# Patient Record
Sex: FEMALE | Race: WHITE
[De-identification: names, ages, dates, MRNs, and addresses within clinical notes are randomized per-mention and may not be internally consistent; named-entity substitution may affect disease eponyms.]

---

## 2018-07-03 ENCOUNTER — HOSPITAL ENCOUNTER (EMERGENCY)
Dept: HOSPITAL 25 - UCEAST | Age: 83
Discharge: HOME | End: 2018-07-03
Payer: MEDICARE

## 2018-07-03 VITALS — SYSTOLIC BLOOD PRESSURE: 144 MMHG | DIASTOLIC BLOOD PRESSURE: 74 MMHG

## 2018-07-03 DIAGNOSIS — S51.812A: Primary | ICD-10-CM

## 2018-07-03 DIAGNOSIS — L03.114: ICD-10-CM

## 2018-07-03 DIAGNOSIS — J44.9: ICD-10-CM

## 2018-07-03 PROCEDURE — 99213 OFFICE O/P EST LOW 20 MIN: CPT

## 2018-07-03 PROCEDURE — G0463 HOSPITAL OUTPT CLINIC VISIT: HCPCS

## 2018-07-03 NOTE — UC
Skin Complaint HPI





- HPI Summary


HPI Summary: 





84 yo female s/p skin tear left FA on 6/29


no cleaned well


now red





thinks Td is UTD





no fever





she cut flap off with scissors





- History of Current Complaint


Chief Complaint: UCSkin


Time Seen by Provider: 07/03/18 08:48


Stated Complaint: SKIN COMPLAINT


Hx Obtained From: Patient


Onset/Duration: Sudden Onset, Lasting Days


Timing: Constant


Onset Severity: Mild


Current Severity: None


Pain Intensity: 0


Pain Scale Used: 0-10 Numeric


Location: Discrete


Character: Redness


Aggravating Factor(s): Nothing


Alleviating Factor(s): Nothing


Associated Signs & Symptoms: Positive: Negative


Related History: Trauma





- Allergy/Home Medications


Allergies/Adverse Reactions: 


 Allergies











Allergy/AdvReac Type Severity Reaction Status Date / Time


 


No Known Allergies Allergy   Verified 07/03/18 08:22














Review of Systems


Constitutional: Negative


Skin: Negative


Eyes: Negative


ENT: Negative


Respiratory: Negative


Cardiovascular: Negative


Gastrointestinal: Negative


Genitourinary: Negative


Motor: Negative


Neurovascular: Negative


Musculoskeletal: Negative


Neurological: Negative


Psychological: Negative


Is Patient Immunocompromised?: No


All Other Systems Reviewed And Are Negative: Yes





PMH/Surg Hx/FS Hx/Imm Hx


Endocrine History: Dyslipidemia


Cardiovascular History: Deep Vein Thrombosis


Respiratory History: COPD


Other History Of: Anticoagulant Therapy - Coumadin





- Surgical History


Surgical History: Yes


Surgery Procedure, Year, and Place: csection x4.  tubal ligation.  

appendectomy.  tonsilectomy,left arm





- Social History


Alcohol Use: Occasionally


Substance Use Type: None


Smoking Status (MU): Never Smoked Tobacco





Physical Exam


Triage Information Reviewed: Yes


Appearance: Well-Appearing, No Pain Distress, Well-Nourished


Vital Signs: 


 Initial Vital Signs











Temp  97.8 F   07/03/18 08:18


 


Pulse  73   07/03/18 08:18


 


Resp  18   07/03/18 08:18


 


BP  144/74   07/03/18 08:18


 


Pulse Ox  99   07/03/18 08:18











Eyes: Positive: Conjunctiva Clear


ENT: Negative: Trismus, Muffled voice, Hoarse voice


Neck: Positive: Supple, Nontender


Respiratory: Positive: Lungs clear, Normal breath sounds, No respiratory 

distress, No accessory muscle use


Cardiovascular: Positive: RRR, No Murmur


Musculoskeletal: Positive: ROM Intact


Neurological: Positive: Alert


Psychological Exam: Normal


Skin Exam: Other - aboout 2 x 2.5 cm area of skin lose left FA with volar 

erthyema





Course/Dx





- Diagnoses


Provider Diagnoses: skin tear left FA.  cellulitis left FA





Discharge





- Sign-Out/Discharge


Documenting (check all that apply): Discharge/Admit/Transfer





- Discharge Plan


Condition: Stable


Disposition: HOME


Referrals: 


Bartolome Bustillo MD [Primary Care Provider] - 





- Billing Disposition and Condition


Condition: STABLE


Disposition: Home

## 2019-02-09 ENCOUNTER — HOSPITAL ENCOUNTER (EMERGENCY)
Dept: HOSPITAL 25 - UCEAST | Age: 84
Discharge: HOME HEALTH | End: 2019-02-09
Payer: MEDICARE

## 2019-02-09 ENCOUNTER — HOSPITAL ENCOUNTER (EMERGENCY)
Dept: HOSPITAL 25 - ED | Age: 84
Discharge: HOME | End: 2019-02-09
Payer: MEDICARE

## 2019-02-09 VITALS — SYSTOLIC BLOOD PRESSURE: 145 MMHG | DIASTOLIC BLOOD PRESSURE: 81 MMHG

## 2019-02-09 VITALS — DIASTOLIC BLOOD PRESSURE: 61 MMHG | SYSTOLIC BLOOD PRESSURE: 121 MMHG

## 2019-02-09 DIAGNOSIS — M79.661: Primary | ICD-10-CM

## 2019-02-09 DIAGNOSIS — M81.0: ICD-10-CM

## 2019-02-09 DIAGNOSIS — J44.9: ICD-10-CM

## 2019-02-09 DIAGNOSIS — Z86.718: ICD-10-CM

## 2019-02-09 DIAGNOSIS — M25.474: ICD-10-CM

## 2019-02-09 DIAGNOSIS — I10: ICD-10-CM

## 2019-02-09 DIAGNOSIS — L03.115: Primary | ICD-10-CM

## 2019-02-09 DIAGNOSIS — Z79.01: ICD-10-CM

## 2019-02-09 LAB
ALBUMIN SERPL BCG-MCNC: 4.4 G/DL (ref 3.2–5.2)
ALBUMIN/GLOB SERPL: 1.7 {RATIO} (ref 1–3)
ALP SERPL-CCNC: 60 U/L (ref 34–104)
ALT SERPL W P-5'-P-CCNC: 15 U/L (ref 7–52)
ANION GAP SERPL CALC-SCNC: 7 MMOL/L (ref 2–11)
AST SERPL-CCNC: 25 U/L (ref 13–39)
BASOPHILS # BLD: 0 10^3/UL (ref 0–0.2)
BUN SERPL-MCNC: 15 MG/DL (ref 6–24)
BUN/CREAT SERPL: 21.7 (ref 8–20)
CALCIUM SERPL-MCNC: 9.9 MG/DL (ref 8.6–10.3)
CHLORIDE SERPL-SCNC: 100 MMOL/L (ref 101–111)
EOSINOPHIL # BLD MANUAL: 0 10^3/UL (ref 0–0.6)
GLOBULIN SER CALC-MCNC: 2.6 G/DL (ref 2–4)
GLUCOSE SERPL-MCNC: 109 MG/DL (ref 70–100)
HCO3 SERPL-SCNC: 27 MMOL/L (ref 22–32)
HCT VFR BLD AUTO: 39 % (ref 35–47)
HGB BLD-MCNC: 12.9 G/DL (ref 12–16)
INR PPP/BLD: 2.99 (ref 0.77–1.02)
MCH RBC QN AUTO: 29 PG (ref 27–31)
MCHC RBC AUTO-ENTMCNC: 34 G/DL (ref 31–36)
MCV RBC AUTO: 86 FL (ref 80–97)
NEUTROPHILS # BLD AUTO: 4.6 10^3/UL (ref 1.5–7.7)
NEUTROPHILS # BLD: 4.9 10^3/UL (ref 1.5–7.7)
PLATELET # BLD AUTO: 141 10^3/UL (ref 150–450)
POTASSIUM SERPL-SCNC: 3.6 MMOL/L (ref 3.5–5)
PROT SERPL-MCNC: 7 G/DL (ref 6.4–8.9)
RBC # BLD AUTO: 4.46 10^6/UL (ref 4–5.4)
SODIUM SERPL-SCNC: 134 MMOL/L (ref 135–145)
VARIANT LYMPHS # BLD MANUAL: 1 % (ref 0–6)
WBC # BLD AUTO: 9.4 10^3/UL (ref 3.5–10.8)

## 2019-02-09 PROCEDURE — 86140 C-REACTIVE PROTEIN: CPT

## 2019-02-09 PROCEDURE — 80053 COMPREHEN METABOLIC PANEL: CPT

## 2019-02-09 PROCEDURE — 36415 COLL VENOUS BLD VENIPUNCTURE: CPT

## 2019-02-09 PROCEDURE — 85025 COMPLETE CBC W/AUTO DIFF WBC: CPT

## 2019-02-09 PROCEDURE — 85060 BLOOD SMEAR INTERPRETATION: CPT

## 2019-02-09 PROCEDURE — 83605 ASSAY OF LACTIC ACID: CPT

## 2019-02-09 PROCEDURE — 85610 PROTHROMBIN TIME: CPT

## 2019-02-09 PROCEDURE — G0463 HOSPITAL OUTPT CLINIC VISIT: HCPCS

## 2019-02-09 PROCEDURE — 99211 OFF/OP EST MAY X REQ PHY/QHP: CPT

## 2019-02-09 PROCEDURE — 99283 EMERGENCY DEPT VISIT LOW MDM: CPT

## 2019-02-09 NOTE — UC
Lower Extremity/Ankle HPI





- HPI Summary


HPI Summary: 





85 y/o female presents to the urgent care accompany by son c/o sudden onset of 

Rt foot pain with swelling an redness since last night. Pt reports this morning 

pain is radiating to her RT  calf. Pt returned from [place] a week ago by 

plane. She can't recall any previous injury. Pain is 4/10 at rest and 7/10 with 

movement and unable to bear any weight. Pt states PMHX of DVT on Warfarin. Her 

INR on Tuesday was 2.5. Pt denies fever, open wound, SOB, palpitations, 

numbness or tingling sensation over the RT lower extremity, SOB, chest pain 

abdominal pain N/V/D, dizziness. Her son states last year she had a similar 

episode and wasseen at [facility] and was Rx Prednisone for a few days and symptoms 

improved.  Pt took Tylenol PO last night to alleviate symptoms. 














- History of Current Complaint


Chief Complaint: UCLowerExtremity


Stated Complaint: R FOOT SWELLING


Time Seen by Provider: 02/09/19 10:38


Hx Obtained From: Patient


Onset/Duration: Gradual Onset, Lasting Days - 1 day, Still Present, Worse Since 

- today


Severity Initially: Mild


Severity Currently: Moderate


Pain Intensity: 4 - at rest and 7/10 with movement


Pain Scale Used: 0-10 Numeric


Aggravating Factor(s): Standing, Ambulation


Alleviating Factor(s): Rest, Elevation, OTC Meds


Able to Bear Weight: No





- Risk Factors


Gout Risk Factors: Negative


DVT Risk Factors: Prior DVT, Recent Travel


Septic Arthritis Risk Factor: Negative





- Allergies/Home Medications


Allergies/Adverse Reactions: 


 Allergies











Allergy/AdvReac Type Severity Reaction Status Date / Time


 


No Known Allergies Allergy   Verified 02/09/19 10:28











Home Medications: 


 Home Medications





Warfarin TAB(*) [Coumadin TAB(*)] 5 mg PO DAILY 02/09/19 [History Confirmed 02/ 09/19]











PMH/Surg Hx/FS Hx/Imm Hx


Previously Healthy: Yes


Endocrine History: Dyslipidemia


Cardiovascular History: Cardiac Disease, Deep Vein Thrombosis


Other History Of: Anticoagulant Therapy - Coumadin





- Surgical History


Surgical History: Yes


Surgery Procedure, Year, and Place: csection x4.  tubal ligation.  

appendectomy.  tonsilectomy,left arm





- Family History


Known Family History: Positive: Cardiac Disease





- Social History


Occupation: Retired


Lives: With Family


Alcohol Use: Occasionally


Substance Use Type: None


Smoking Status (MU): Never Smoked Tobacco





Review of Systems


All Other Systems Reviewed And Are Negative: Yes


Constitutional: Positive: Negative


Skin: Positive: Other - Rt foot and ankle swelling with redness in the lateral 

side of foot


Eyes: Positive: Negative


ENT: Positive: Negative


Respiratory: Positive: Negative


Cardiovascular: Positive: Negative


Gastrointestinal: Positive: Negative


Genitourinary: Positive: Negative


Motor: Positive: Negative


Neurovascular: Positive: Negative


Musculoskeletal: Positive: Decreased ROM - Rt ankle and Rt foot, Other: - Rt 

calf pain, ankle and foot pain


Neurological: Positive: Negative


Psychological: Positive: Negative


Is Patient Immunocompromised?: No





Physical Exam





- Summary


Physical Exam Summary: 





Vital Signs Reviewed: Yes


Appearance: Well-Appearing, No Pain Distress, Well-Nourished, Obese old female 

sitting in the wheel chair w/o any apparent pain distress


Eyes: Positive: Conjunctiva Clear - PERRLA< ROSALIA, fundi grossly WNL


ENT: Positive: Normal ENT inspection, Hearing grossly normal, Pharynx normal, 

TMs normal, Uvula midline


Neck: Positive: Supple, Nontender, No Lymphadenopathy


Respiratory: Positive: Chest non-tender, Lungs clear, Normal breath sounds, No 

respiratory distress


Cardiovascular: Positive: RRR, No Murmur, Pulses Normal, Brisk Capillary Refill


Abdomen Description: Positive: Nontender, No Organomegaly, Soft.  Negative: CVA 

Tenderness (R), CVA Tenderness (L)


Bowel Sounds: Positive: Present


Extremities: R extremity without deformity or asymmetry when compared to the L.

  Positive lateral  side of the RT foot and ankle with  with moderate soft 

tissue swelling  and mild edema, erythema with indistinct borders and warm to 

touch. No lesions or break in skin integrity.  Diameter of calves same.   Soft 

tissues of posterior lower  legs are soft, supple,   No evidence of gangrene or 

compartment syndrome.  Medial thigh is without soft tissue swelling or tender 

to palpation.  Positive Homans sign.  decrease ROM of the Rt ankle , and foot 

and toes.  No proximal lymphangitis or lymphadenopathy.


Neurological Exam: Normal


Psychological Exam: Normal


Skin Exam: Normal





Triage Information Reviewed: Yes


Vital Signs: 


 Initial Vital Signs











Temp  97.5 F   02/09/19 10:20


 


Pulse  74   02/09/19 10:20


 


Resp  18   02/09/19 10:20


 


BP  121/61   02/09/19 10:20


 


Pulse Ox  100   02/09/19 10:20














Lower Extremity Course/Dx





- Course


Course Of Treatment: 85 y/o female presents to the urgent care accompany by son 

c/o sudden onset of Rt foot pain with swelling an redness since last night. Pt 

reports this morning pain is radiating to her RT  calf. Pt returned from 

[place] a week ago by plane. She can't recall any previous injury. Pain is 4/10 

at rest and 7/10 with movement and unable to bear any weight. Pt states PMHX of 

DVT on Warfarin. Her INR on Tuesday was 2.5. Pt denies fever, open wound, SOB, 

palpitations, numbness or tingling sensation over the RT lower extremity, SOB, 

chest pain abdominal pain N/V/D, dizziness. Her son states last year she had a 

similar episode and wasseen at [facility] and was Rx Prednisone for a few days and 

symptoms improved.  Pt took Tylenol PO last night to alleviate symptoms. Hx 

obtained. Pt w/   Positive lateral  side of the RT foot and ankle with moderate 

soft tissue swelling  and mild edema, erythemathous patch with indistinct 

borders and warm to touch. No lesions or break in skin integrity. Steve's sign 

positive. Pt is hemodynamically stable, vital sings WNL. Pt probably with 

cellulitis of Rt foot. However Pt presnting with calf pain w/ Hx of DVT and 

recent travel. At this moment not ultrasound available at the clinic to r/o 

DVT. Pt's symptoms discussed with Dr Cerda. She recommended Pt should go to 

the ER for further evalaution and treatment. Pt and Pt's son advised the 

improtance to fo to the ER to r/o DVT since no ultrasound available. Pt offered 

ambulance transfer which they declined and son stated he will take Pt to the ER 

by Private car. Pt understood and agreed and left the clinicn hemodynamically 

stable, A&OX3 stating he will inmediately go to the ER. I called  [facility] and 

spoke to ÁLVARO Senior in regards to Pt's symptoms who accepted the patient.





- Differential Dx/Diagnosis


Differential Diagnosis/HQI/PQRI: Cellulitis, DVT, Infection, Sprain


Provider Diagnosis: 


 Pain of right calf, Swelling of right foot








Discharge





- Sign-Out/Discharge


Documenting (check all that apply): Patient Departure - Pt hightly recommended 


All imaging exams completed and their final reports reviewed: No Studies





- Discharge Plan


Condition: Stable


Disposition: HOME-RECOMMEND TO ED


Patient Education Materials:  Leg Pain (ED)


Referrals: 


Bartolome Bustillo MD [Primary Care Provider] - 


Additional Instructions: 


I think you need a higher level or care for your presenting symptoms. I highly 

recommend you to go to the ER for further evaluation and treatment. The risks 

of not going can be death, PE, DVT, heart attack, etc.  I spoke to the ER  

attending ÁLVARO Senior.  They are expecting you. 














- Billing Disposition and Condition


Condition: STABLE


Disposition: Home-Recommend to ED

## 2019-02-09 NOTE — ED
Lower Extremity





- HPI Summary


HPI Summary: 


This patient is an 86 year old F presenting to [facility] upon referral from 

[facility] with a chief complaint of right foot pain, swelling, and 

erythema since 1 day ago. The patient reports that the pain worsened since 1 

day ago. She notes that the pain started in the arch of her right foot. The 

patient rates the pain 5/10 in severity. Symptoms aggravated by palpation. 

Symptoms alleviated by nothing.








- History of Current Complaint


Chief Complaint: EDExtremityLower


Stated Complaint: RIGHT FOOT PAIN


Time Seen by Provider: 02/09/19 11:40


Hx Obtained From: Patient


Mechanism Of Injury: Unknown


Onset of Pain: Days - 1 day


Onset/Duration: Worse Since - 1 day ago


Severity Initially: Mild


Severity Currently: Mild


Pain Intensity: 5


Pain Scale Used: 0-10 Numeric


Timing: Constant


Location: Is Discrete @ - right foot


Associated Signs And Symptoms: Positive: Swelling, Redness


Aggravating Factor(s): Other - palpation


Alleviating Factor(s): Nothing





- Allergies/Home Medications


Allergies/Adverse Reactions: 


 Allergies











Allergy/AdvReac Type Severity Reaction Status Date / Time


 


No Known Allergies Allergy   Verified 02/09/19 10:28














PMH/Surg Hx/FS Hx/Imm Hx


Endocrine/Hematology History: Reports: Hx Anticoagulant Therapy - Coumadin


   Denies: Hx Diabetes, Hx Thyroid Disease


Cardiovascular History: Reports: Hx Hypertension


   Denies: Hx Pacemaker/ICD


Respiratory History: Reports: Hx Chronic Obstructive Pulmonary Disease (COPD)


GI History: 


   Denies: Hx Ulcer


 History: 


   Denies: Hx Renal Disease


Musculoskeletal History: Reports: Hx Osteoporosis


Sensory History: 


   Denies: Hx Hearing Aid


Psychiatric History: 


   Denies: Hx Panic Disorder





- Cancer History


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


Hx Chemotherapy: No


Hx Radiation Therapy: No





- Surgical History


Surgery Procedure, Year, and Place: csection x4.  tubal ligation.  

appendectomy.  tonsilectomy,left arm


Infectious Disease History: No


Infectious Disease History: 


   Denies: Hx Clostridium Difficile, Hx Hepatitis, Hx Human Immunodeficiency 

Virus (HIV), Hx of Known/Suspected MRSA, Traveled Outside the [place] in Last 30 Days





- Family History


Known Family History: 


   Negative: Diabetes





- Social History


Alcohol Use: Occasionally


Substance Use Type: Reports: None


Smoking Status (MU): Never Smoked Tobacco





Review of Systems


Negative: Fever


Negative: Epistaxis


Negative: Vomiting


Musculoskeletal: Other - right foot pain, right foot swelling


Skin: Other - erythema in right foot


All Other Systems Reviewed And Are Negative: Yes





Physical Exam





- Summary


Physical Exam Summary: 


Appearance: The patient is well-nourished in no acute distress and in no acute 

pain.


 


Skin: The skin is warm and dry and skin color reflects adequate perfusion. 

Right foot is erythematous.





HEENT: The head is normocephalic and atraumatic. The pupils are equal and 

reactive. The conjunctivae are clear and without drainage. Nares are patent and 

without drainage. Mouth reveals moist mucous membranes and the throat is 

without erythema and exudate. The external ears are intact. The ear canals are 

patent and without drainage. The tympanic membranes are intact.


 


Neck: The neck is supple with full range of motion and non-tender. There are no 

carotid bruits. There is no neck vein distension.


 





Respiratory: Chest is non-tender. Lungs are clear to auscultation and breath 

sounds are symmetrical and equal.


 


Cardiovascular: Heart is regular rate and rhythm. There is no murmur or rub 

auscultated. Pulses are symmetrical and equal. Good dorsalis pedis pulses.


 


Abdomen: The abdomen is soft and non-tender. There are normal bowel sounds 

heard in all four quadrants and there is no organomegaly palpated.


 


Musculoskeletal: There is no back tenderness noted. Extremities with full range 

of motion. There is good capillary refill. There is no calf tenderness 

elicited. Good dorsalis pedis pulses. Right foot mildly swollen, erythematous 

and tender.


 


Neurological: Patient is alert and oriented to person, place and time. The 

patient has symmetrical motor strength in all four extremities. Cranial nerves 

are grossly intact. Deep tendon reflexes are symmetrical and equal in all four 

extremities.


 


Psychiatric: The patient has an appropriate affect and does not exhibit any 

anxiety or depression.





Triage Information Reviewed: Yes


Vital Signs On Initial Exam: 


 Initial Vitals











Temp Pulse Resp BP Pulse Ox


 


 97.0 F   74   16   148/62   100 


 


 02/09/19 11:33  02/09/19 11:33  02/09/19 11:33  02/09/19 11:33  02/09/19 11:33











Vital Signs Reviewed: Yes





Diagnostics





- Vital Signs


 Vital Signs











  Temp Pulse Resp BP Pulse Ox


 


 02/09/19 11:33  97.0 F  74  16  148/62  100














- Laboratory


Result Diagrams: 


 02/09/19 11:08





 02/09/19 11:08


Lab Statement: Any lab studies that have been ordered have been reviewed, and 

results considered in the medical decision making process.





- Radiology


  ** Right Foot XR


Radiology Interpretation Completed By: Radiologist


Summary of Radiographic Findings: IMPRESSION:  SOFT TISSUE SWELLING, NO 

FRACTURE IS SEEN. Dr. Talbot has reviewed this report.





- Additional Comments


Diagnostic Additional Comments: 


Venous Doppler Study:


Interpreted by radiologist.


Impression: no evidence for DVT.


Dr. Talbot has reviewed this report





Lower Extremity Course/Dx





- Course


Course Of Treatment: Ms. Nelson presented with a swollen and painful right foot 

that started last night.  She's not aware of any injury.  She is nontoxic in 

appearance with stable vital signs.  Her right foot was mildly swollen, 

erythematous and tender.  I don't see any skin break but I believe she has a 

cellulitis.  She had no leukocytosis.  X-ray was unremarkable and ultrasound 

negative for DVT.  I will treat her with antibiotics and close follow-up.





- Diagnoses


Provider Diagnoses: 


 Cellulitis








Discharge





- Sign-Out/Discharge


Documenting (check all that apply): Patient Departure - Discharge home


Patient Received Moderate/Deep Sedation with Procedure: No





- Discharge Plan


Condition: Stable


Disposition: HOME


Prescriptions: 


Cephalexin CAP* [Keflex 500 CAP*] 500 mg PO QID #40 cap


Patient Education Materials:  Cellulitis (ED)


Referrals: 


Bartolome Bustillo MD [Primary Care Provider] - 


Additional Instructions: 


Follow up with primary care physician in 2-3 days. Return to the emergency 

department with any new or worsening symptoms.





- Billing Disposition and Condition


Condition: STABLE


Disposition: Home





- Attestation Statements


Document Initiated by Renetta: Yes


Documenting Scribe: Deyanira Chacon


Provider For Whom Renetta is Documenting (Include Credential): Chilango Talbot MD


Scribzheng Attestation: 


Deyanira BARBER, scribed for Chilango Talbot MD on 02/09/19 at 1556. 


Scribe Documentation Reviewed: Yes


Provider Attestation: 


The documentation as recorded by the Deyanira munoz accurately 

reflects the service I personally performed and the decisions made by me, 

Chilango Talbot MD


Status of Scribe Document: Viewed

## 2019-02-26 ENCOUNTER — HOSPITAL ENCOUNTER (EMERGENCY)
Dept: HOSPITAL 25 - UCEAST | Age: 84
Discharge: HOME | End: 2019-02-26
Payer: MEDICARE

## 2019-02-26 VITALS — DIASTOLIC BLOOD PRESSURE: 73 MMHG | SYSTOLIC BLOOD PRESSURE: 160 MMHG
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
## 2019-02-26 DIAGNOSIS — S81.812A: Primary | ICD-10-CM

## 2019-02-26 DIAGNOSIS — L08.9: ICD-10-CM

## 2019-02-26 DIAGNOSIS — I10: ICD-10-CM

## 2019-02-26 DIAGNOSIS — J44.9: ICD-10-CM

## 2019-02-26 PROCEDURE — G0463 HOSPITAL OUTPT CLINIC VISIT: HCPCS

## 2019-02-26 PROCEDURE — 99212 OFFICE O/P EST SF 10 MIN: CPT

## 2019-02-26 NOTE — UC
Skin Complaint HPI





- HPI Summary


HPI Summary: 





87 yo female presents with left lower leg wound. She tells me that about a week 

ago she accidentally hit her left against a piece of furniture at home. Has a 

small skin tear that she has bandaged and been applying neosporin to. Over the 

last 2 days has noticed redness and swelling to the area. Denies fever, 

drainage from the site, or streaking.











- History of Current Complaint


Chief Complaint: UCWounds


Time Seen by Provider: 02/26/19 09:41


Stated Complaint: WOUND CHECK


Hx Obtained From: Patient


Hx Last Menstrual Period: na


Onset/Duration: Gradual Onset


Current Severity: None


Pain Intensity: 0





- Allergy/Home Medications


Allergies/Adverse Reactions: 


 Allergies











Allergy/AdvReac Type Severity Reaction Status Date / Time


 


No Known Allergies Allergy   Verified 02/26/19 09:26














PMH/Surg Hx/FS Hx/Imm Hx


Endocrine History: Dyslipidemia


Cardiovascular History: Hypertension, Atrial Fibrillation


Respiratory History: COPD, Asthma


Other History Of: Anticoagulant Therapy - Coumadin





- Surgical History


Surgical History: Yes


Surgery Procedure, Year, and Place: csection x4.  tubal ligation.  

appendectomy.  tonsilectomy,left arm





- Family History


Known Family History: 


   Negative: Diabetes





- Social History


Lives: With Family


Alcohol Use: Occasionally


Substance Use Type: None


Smoking Status (MU): Never Smoked Tobacco





Review of Systems


All Other Systems Reviewed And Are Negative: Yes


Constitutional: Positive: Negative


Skin: Positive: Other - Skin tear left lower leg


Respiratory: Positive: Negative


Cardiovascular: Positive: Negative


Neurovascular: Positive: Negative


Neurological: Positive: Negative


Psychological: Positive: Negative





Physical Exam





- Summary


Physical Exam Summary: 





GENERAL: NAD. WDWN. No pain distress.


SKIN: LEFT LOWER LEG: posterior aspect with 1.5cm superficial skin tear with 

mild surrounding edema and erythema. NTTP. No warmth


NECK: Supple. Nontender. No lymphadenopathy. 


CHEST:  No accessory muscle use. Breathing comfortably and in no distress.


CV:  Pulses intact. Cap refill <2seconds


NEURO: Alert.


PSYCH: Age appropriate behavior.





Triage Information Reviewed: Yes


Vital Signs: 


 Initial Vital Signs











Temp  98.6 F   02/26/19 09:22


 


Pulse  83   02/26/19 09:22


 


Resp  20   02/26/19 09:22


 


BP  160/73   02/26/19 09:22


 


Pulse Ox  98   02/26/19 09:22











Vital Signs Reviewed: Yes





Course/Dx





- Course


Course Of Treatment: Skin tear to left lower leg with mild infection. Rx for 

keflex and advised to keep changing dressing daily.





- Diagnoses


Provider Diagnosis: 


 Skin tear








Discharge





- Sign-Out/Discharge


Documenting (check all that apply): Patient Departure


All imaging exams completed and their final reports reviewed: No Studies





- Discharge Plan


Condition: Stable


Disposition: HOME


Prescriptions: 


Cephalexin CAP* [Keflex CAP*] 500 mg PO BID #14 cap


Patient Education Materials:  Acute Wound Care (ED)


Referrals: 


Bartolome Bustillo MD [Primary Care Provider] - 


Additional Instructions: 


If you develop a fever, shortness of breath, chest pain, new or worsening 

symptoms - please call your PCP or go to the ED.


 





Your blood pressure was high at todays visit. Please see your primary provider 

within 4 weeks for recheck and re-evaluation.








Keep changing the dressing daily until well healed. 





- Billing Disposition and Condition


Condition: STABLE


Disposition: Home